# Patient Record
Sex: MALE | Race: WHITE
[De-identification: names, ages, dates, MRNs, and addresses within clinical notes are randomized per-mention and may not be internally consistent; named-entity substitution may affect disease eponyms.]

---
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
## 2024-02-15 RX ORDER — DEXTROAMPHETAMINE SACCHARATE, AMPHETAMINE ASPARTATE MONOHYDRATE, DEXTROAMPHETAMINE SULFATE AND AMPHETAMINE SULFATE 5; 5; 5; 5 MG/1; MG/1; MG/1; MG/1
20 CAPSULE, EXTENDED RELEASE ORAL EVERY MORNING
COMMUNITY

## 2024-02-15 RX ORDER — DEXTROAMPHETAMINE SACCHARATE, AMPHETAMINE ASPARTATE, DEXTROAMPHETAMINE SULFATE AND AMPHETAMINE SULFATE 5; 5; 5; 5 MG/1; MG/1; MG/1; MG/1
TABLET ORAL DAILY
COMMUNITY
End: 2024-02-15 | Stop reason: ALTCHOICE

## 2024-02-21 ENCOUNTER — HOSPITAL ENCOUNTER (OUTPATIENT)
Facility: HOSPITAL | Age: 19
Setting detail: HOSPITAL OUTPATIENT SURGERY
Discharge: HOME | End: 2024-02-21
Attending: PEDIATRICS | Admitting: PEDIATRICS
Payer: COMMERCIAL

## 2024-02-21 ENCOUNTER — ANESTHESIA EVENT (OUTPATIENT)
Dept: ENDOSCOPY | Facility: HOSPITAL | Age: 19
End: 2024-02-21
Payer: COMMERCIAL

## 2024-02-21 ENCOUNTER — ANESTHESIA (OUTPATIENT)
Dept: ENDOSCOPY | Facility: HOSPITAL | Age: 19
End: 2024-02-21
Payer: COMMERCIAL

## 2024-02-21 VITALS
TEMPERATURE: 98 F | HEART RATE: 71 BPM | SYSTOLIC BLOOD PRESSURE: 118 MMHG | HEIGHT: 71 IN | OXYGEN SATURATION: 100 % | BODY MASS INDEX: 21.7 KG/M2 | RESPIRATION RATE: 18 BRPM | WEIGHT: 155 LBS | DIASTOLIC BLOOD PRESSURE: 77 MMHG

## 2024-02-21 PROCEDURE — 0DBM8ZX EXCISION OF DESCENDING COLON, VIA NATURAL OR ARTIFICIAL OPENING ENDOSCOPIC, DIAGNOSTIC: ICD-10-PCS | Performed by: PEDIATRICS

## 2024-02-21 PROCEDURE — 88305 TISSUE EXAM BY PATHOLOGIST: CPT | Performed by: PEDIATRICS

## 2024-02-21 RX ORDER — SODIUM CHLORIDE, SODIUM LACTATE, POTASSIUM CHLORIDE, CALCIUM CHLORIDE 600; 310; 30; 20 MG/100ML; MG/100ML; MG/100ML; MG/100ML
INJECTION, SOLUTION INTRAVENOUS CONTINUOUS
Status: DISCONTINUED | OUTPATIENT
Start: 2024-02-21 | End: 2024-02-21

## 2024-02-21 RX ORDER — LIDOCAINE HYDROCHLORIDE 10 MG/ML
INJECTION, SOLUTION EPIDURAL; INFILTRATION; INTRACAUDAL; PERINEURAL AS NEEDED
Status: DISCONTINUED | OUTPATIENT
Start: 2024-02-21 | End: 2024-02-21 | Stop reason: SURG

## 2024-02-21 RX ORDER — NALOXONE HYDROCHLORIDE 0.4 MG/ML
0.08 INJECTION, SOLUTION INTRAMUSCULAR; INTRAVENOUS; SUBCUTANEOUS ONCE AS NEEDED
Status: DISCONTINUED | OUTPATIENT
Start: 2024-02-21 | End: 2024-02-21

## 2024-02-21 RX ADMIN — LIDOCAINE HYDROCHLORIDE 50 MG: 10 INJECTION, SOLUTION EPIDURAL; INFILTRATION; INTRACAUDAL; PERINEURAL at 11:41:00

## 2024-02-21 NOTE — H&P
History & Physical Examination    Patient Name: Carlos Javeir  MRN: [id number]  CSN: [id number]  YOB: 2005    Diagnosis: colitis    Present Illness: colitis  Medications Prior to Admission   Medication Sig Dispense Refill Last Dose    Amphetamine-Dextroamphet ER 20 MG Oral Capsule SR 24 Hr Take 1 capsule (20 mg total) by mouth every morning.   2/20/2024    MESALAMINE OR Take by mouth. 3 tabs  daily   2/20/2024       Current Facility-Administered Medications   Medication Dose Route Frequency    lactated ringers infusion   Intravenous Continuous     Facility-Administered Medications Ordered in Other Encounters   Medication Dose Route Frequency    lidocaine PF (Xylocaine-MPF) 1% injection   Intravenous PRN    propofol (Diprivan) 10 MG/ML injection   Intravenous PRN    propofol (Diprivan) 10 mg/mL infusion premix   Intravenous Continuous PRN       Allergies: Seasonal    Past Medical History:   Diagnosis Date    Attention deficit disorder     Colitis      Past Surgical History:   Procedure Laterality Date    COLONOSCOPY      colonoscopy    COLONOSCOPY N/A 6/19/2020    Procedure: COLONOSCOPY;  Surgeon: Jorge Luis Mares MD;  Location:  ENDOSCOPY     History reviewed. No pertinent family history.  Social History     Tobacco Use    Smoking status: Never    Smokeless tobacco: Never   Substance Use Topics    Alcohol use: Never       SYSTEM Check if Review is Normal Check if Physical Exam is Normal If not normal, please explain:   HEENT [ x] x    NECK & BACK x x    HEART x x    LUNGS x x    ABDOMEN x x    UROGENITAL x x    EXTREMITIES x x    OTHER        [ x ] I have discussed the risks and benefits and alternatives with the patient/family.  They understand and agree to proceed with plan of care.  [ x ] I have reviewed the History and Physical done within the last 30 days.  Any changes noted above.    Mp Clifford MD  2/21/2024  11:58 AM

## 2024-02-21 NOTE — BRIEF OP NOTE
Pre-Operative Diagnosis: COLITIS     Post-Operative Diagnosis: left sided colitis      Procedure Performed:   COLONOSCOPY with biopsies    Surgeon(s) and Role:     * Mp Clifford MD - Primary    Assistant(s):        Surgical Findings: normal  ti, cecum, ascending and transverse  Descending and sigmoid edematous, loss of vascular markings, edema. Normal rectum     Specimen: biopses       Estimated Blood Loss: No data recorded    Dictation Number:        Mp Clifford MD  2/21/2024  11:59 AM

## 2024-02-21 NOTE — ANESTHESIA PREPROCEDURE EVALUATION
PRE-OP EVALUATION    Patient Name: Carlos Javier    Admit Diagnosis: COLITIS    Pre-op Diagnosis: COLITIS    COLONOSCOPY    Anesthesia Procedure: COLONOSCOPY    Surgeon(s) and Role:     * Mp Clifford MD - Primary    Pre-op vitals reviewed.  Temp: 98.3 °F (36.8 °C)  Pulse: 69  Resp: 20  BP: 109/64  SpO2: 100 %  Body mass index is 21.62 kg/m².    Current medications reviewed.  Hospital Medications:   lactated ringers infusion   Intravenous Continuous       Outpatient Medications:     Medications Prior to Admission   Medication Sig Dispense Refill Last Dose    Amphetamine-Dextroamphet ER 20 MG Oral Capsule SR 24 Hr Take 1 capsule (20 mg total) by mouth every morning.   2/20/2024    MESALAMINE OR Take by mouth. 3 tabs  daily   2/20/2024       Allergies: Seasonal      Anesthesia Evaluation    Patient summary reviewed.    Anesthetic Complications           GI/Hepatic/Renal                          (+) ulcerative colitis       Cardiovascular    Negative cardiovascular ROS.    Exercise tolerance: good     MET: >4                                           Endo/Other    Negative endo/other ROS.                              Pulmonary    Negative pulmonary ROS.                       Neuro/Psych    Negative neuro/psych ROS.                          ADD        Past Surgical History:   Procedure Laterality Date    COLONOSCOPY      colonoscopy    COLONOSCOPY N/A 6/19/2020    Procedure: COLONOSCOPY;  Surgeon: Jorge Luis Mares MD;  Location:  ENDOSCOPY     Social History     Socioeconomic History    Marital status: Single   Tobacco Use    Smoking status: Never    Smokeless tobacco: Never   Vaping Use    Vaping Use: Never used   Substance and Sexual Activity    Alcohol use: Never    Drug use: Never     History   Drug Use Unknown     Available pre-op labs reviewed.               Airway      Mallampati: I  Mouth opening: >3 FB  TM distance: > 6 cm  Neck ROM: full Cardiovascular    Cardiovascular exam normal.          Dental    Dentition appears grossly intact         Pulmonary    Pulmonary exam normal.                 Other findings              ASA: 2   Plan: MAC  NPO status verified and patient meets guidelines.    Post-procedure pain management plan discussed with surgeon and patient.      Plan/risks discussed with: patient                Present on Admission:  **None**

## 2024-02-21 NOTE — DISCHARGE INSTRUCTIONS
Home Care Instructions for Colonoscopy  with Sedation    Diet:  - Resume your regular diet as tolerated unless otherwise instructed.  - Start with light meals to minimize bloating.  - Do not drink alcohol today.    Medication:  - If you have questions about resuming your normal medications, please contact your Primary Care Physician.    Activities:  - Take it easy today. Do not return to work today.  - Do not drive today.  - Do not operate any machinery today (including kitchen equipment).    Colonoscopy:  - You may notice some rectal \"spotting\" (a little blood on the toilet tissue) for a day or two after the exam. This is normal.  - If you experience any rectal bleeding (not spotting), persistent tenderness or sharp severe abdominal pains, oral temperature over 100 degrees Fahrenheit, light-headedness or dizziness, or any other problems, contact your doctor.      **If unable to reach your doctor, please go to the [facility] Emergency Room**    - Your referring physician will receive a full report of your examination.  - If you do not hear from your doctor's office within two weeks of your biopsy, please call them for your results.    You may be able to see your laboratory results in [employer] between 4 and 7 business days.  In some cases, your physician may not have viewed the results before they are released to [employer].  If you have questions regarding your results contact the physician who ordered the test/exam by phone or via [employer] by choosing \"Ask a Medical Question.\"

## 2024-02-21 NOTE — ANESTHESIA POSTPROCEDURE EVALUATION
[facility]    Carlos Javeir Patient Status:  Hospital Outpatient Surgery   Age/Gender 19 year old male MRN [id number]   Location [facility] ENDOS[facility] Attending Mp Clifford MD   Hosp Day # 0 PCP CARLOS NUNO MD       Anesthesia Post-op Note    COLONOSCOPY with biopsies    Procedure Summary       Date: 02/21/24 Room / Location:  ENDOSCOPY 04 / [facility] END[facility]COPY    Anesthesia Start: 1141 Anesthesia Stop:     Procedure: COLONOSCOPY with biopsies Diagnosis: (left sided colitis)    Surgeons: Mp Clifford MD Anesthesiologist: Carlin Virgen MD    Anesthesia Type: MAC ASA Status: 2            Anesthesia Type: MAC    Vitals Value Taken Time   /69 02/21/24 1201   Temp  02/21/24 1202   Pulse 86 02/21/24 1201   Resp 18 02/21/24 1201   SpO2 95 % 02/21/24 1201   Vitals shown include unfiled device data.    Patient Location: Endoscopy    Anesthesia Type: MAC    Airway Patency: patent    Postop Pain Control: adequate    Mental Status: mildly sedated but able to meaningfully participate in the post-anesthesia evaluation    Nausea/Vomiting: none    Cardiopulmonary/Hydration status: stable euvolemic    Complications: no apparent anesthesia related complications    Postop vital signs: stable    Dental Exam: Unchanged from Preop    Patient to be discharged home when criteria met.

## 2024-02-21 NOTE — OPERATIVE REPORT
[facility]    PATIENT'S NAME: DEBBY PITTS   ATTENDING PHYSICIAN: Mp Clifford M.D.   OPERATING PHYSICIAN: Mp Clifford M.D.   PATIENT ACCOUNT#:   [id number]    LOCATION:  [address]  MEDICAL RECORD #:   [id number]       YOB: 2005  ADMISSION DATE:       02/21/2024      OPERATION DATE:  02/21/2024    OPERATIVE REPORT      PREOPERATIVE DIAGNOSIS:  Diarrhea, colitis.  POSTOPERATIVE DIAGNOSIS:  Left-sided colitis.  PROCEDURE:  Colonoscopy with biopsy.    SEDATION:  MAC.    OPERATIVE TECHNIQUE:  Under adequate sedation, pediatric colonoscope inserted into the anus, gradually advanced into the rectum.  Sigmoid colon, descending colon, transverse colon, ascending colon, and cecum easily and successfully intubated.  Terminal ileum opening was seen.  TI was intubated.  Endoscope then gradually withdrawn.  Each and every part of the colon was looked at very carefully.  Following were the findings:    1.   Terminal ileum normal, with no inflammation, no erosion, no ulcerations seen.  2.   Cecum, ascending colon, transverse colon appeared completely normal with normal vascular markings with normal folds with no inflammation, no erosions.  3.   Descending colon appeared mildly to moderately edematous with loss of vascular markings, loss of folds.  Biopsies obtained.  There were some superficial aphthous ulcers.  4.   Sigmoid colon appeared normal.  5.   Rectum appeared normal.    Based upon this appearance, his descending colon specifically appeared edematous, but no massive ulcerations.  Biopsies awaited.  Patient will be followed up.    Dictated By Mp Clifford M.D.  d: 02/21/2024 12:12:37  t: 02/21/2024 14:07:08  [facility] [id number]  /

## (undated) DEVICE — KIT VLV 5 PC AIR H2O SUCT BX ENDOGATOR CONN

## (undated) DEVICE — Device: Brand: DEFENDO AIR/WATER/SUCTION AND BIOPSY VALVE

## (undated) DEVICE — 3M™ RED DOT™ MONITORING ELECTRODE WITH FOAM TAPE AND STICKY GEL, 50/BAG, 20/CASE, 72/PLT 2570: Brand: RED DOT™

## (undated) DEVICE — KIT CUSTOM ENDOPROCEDURE STERIS

## (undated) DEVICE — FORCEP BIOPSY RJ4 LG CAP W/ND

## (undated) DEVICE — 1200CC GUARDIAN II: Brand: GUARDIAN

## (undated) DEVICE — ELECTRODE EKG W3.5XL4CM ABRAD W1.25XL1.5IN

## (undated) DEVICE — FORCEPS BX L240CM JAW DIA2.2MM 2.8MM WRK CHN

## (undated) DEVICE — CANISTER SUCT 1200CC LID BLU HRD ADPT AUTO

## (undated) DEVICE — MASK ETCO2 PANORAMIC

## (undated) DEVICE — ENDOSCOPY PACK UPPER: Brand: MEDLINE INDUSTRIES, INC.

## (undated) DEVICE — ENDOSCOPY PACK - LOWER: Brand: MEDLINE INDUSTRIES, INC.

## (undated) NOTE — LETTER
Sheryl Price [address]  [place], [address] [facility]    Consent for Operation  Date: __________________                                Time: _______________    1.  I authorize the performance upon Raul Urias the following operation:  Procedure(s) procedure has been videotaped, the surgeon will obtain the original videotape. The hospital will not be responsible for storage or maintenance of this tape.   8. For the purpose of advancing medical education, I consent to the admittance of observers to the STATEMENTS REQUIRING INSERTION OR COMPLETION WERE FILLED IN.     Signature of Patient:   ___________________________    When the patient is a minor or mentally incompetent to give consent:  Signature of person authorized to consent for patient: ____________ drugs/illegal medications). Failure to inform my anesthesiologist about these medicines may increase my risk of anesthetic complications. iv. If I am allergic to anything or have had a reaction to anesthesia before.   3. I understand how the anesthesia med I have discussed the procedure and information above with the patient (or patient’s representative) and answered their questions. The patient or their representative has agreed to have anesthesia services.     _______________________________________________